# Patient Record
Sex: MALE | Race: WHITE | Employment: FULL TIME | URBAN - METROPOLITAN AREA
[De-identification: names, ages, dates, MRNs, and addresses within clinical notes are randomized per-mention and may not be internally consistent; named-entity substitution may affect disease eponyms.]

---

## 2017-10-03 ENCOUNTER — HOSPITAL ENCOUNTER (EMERGENCY)
Age: 47
Discharge: HOME | End: 2017-10-03
Attending: EMERGENCY MEDICINE
Payer: COMMERCIAL

## 2017-10-03 VITALS
TEMPERATURE: 97.9 F | SYSTOLIC BLOOD PRESSURE: 148 MMHG | OXYGEN SATURATION: 100 % | HEART RATE: 110 BPM | DIASTOLIC BLOOD PRESSURE: 86 MMHG

## 2017-10-03 DIAGNOSIS — S01.01XA LACERATION OF SCALP WITHOUT FOREIGN BODY, INITIAL ENCOUNTER: Primary | ICD-10-CM

## 2017-10-03 PROCEDURE — 6360000002 HC RX W HCPCS: Performed by: PHYSICIAN ASSISTANT

## 2017-10-03 PROCEDURE — 90471 IMMUNIZATION ADMIN: CPT | Performed by: PHYSICIAN ASSISTANT

## 2017-10-03 PROCEDURE — 90715 TDAP VACCINE 7 YRS/> IM: CPT | Performed by: PHYSICIAN ASSISTANT

## 2017-10-03 PROCEDURE — G0381 LEV 2 HOSP TYPE B ED VISIT: HCPCS

## 2017-10-03 PROCEDURE — 12002 RPR S/N/AX/GEN/TRNK2.6-7.5CM: CPT

## 2017-10-03 RX ORDER — IBUPROFEN 800 MG/1
800 TABLET ORAL EVERY 8 HOURS PRN
Qty: 30 TABLET | Refills: 0 | Status: SHIPPED | OUTPATIENT
Start: 2017-10-03

## 2017-10-03 RX ADMIN — TETANUS TOXOID, REDUCED DIPHTHERIA TOXOID AND ACELLULAR PERTUSSIS VACCINE, ADSORBED 0.5 ML: 5; 2.5; 8; 8; 2.5 SUSPENSION INTRAMUSCULAR at 13:44

## 2017-10-03 ASSESSMENT — ENCOUNTER SYMPTOMS
VOMITING: 0
SHORTNESS OF BREATH: 0
ABDOMINAL PAIN: 0
BACK PAIN: 0
DIARRHEA: 0
COLOR CHANGE: 0
NAUSEA: 0
COUGH: 0

## 2017-10-03 ASSESSMENT — PAIN DESCRIPTION - DESCRIPTORS: DESCRIPTORS: ACHING

## 2017-10-03 ASSESSMENT — PAIN DESCRIPTION - PAIN TYPE: TYPE: ACUTE PAIN

## 2017-10-03 ASSESSMENT — PAIN DESCRIPTION - FREQUENCY: FREQUENCY: INTERMITTENT

## 2017-10-03 ASSESSMENT — PAIN SCALES - GENERAL: PAINLEVEL_OUTOF10: 4

## 2017-10-03 ASSESSMENT — PAIN DESCRIPTION - LOCATION: LOCATION: HEAD

## 2017-10-03 NOTE — ED AVS SNAPSHOT
After Visit Summary  (Discharge Instructions)    Medication List for Home    Based on the information you provided to us as well as any changes during this visit, the following is your updated medication list.  Compare this with your prescription bottles at home. If you have any questions or concerns, contact your primary care physician's office. Daily Medication List (This medication list can be shared with any Healthcare provider who is helping you manage your medications)      There are NEW medications for you. START taking them after you leave the hospital     ibuprofen 800 MG tablet   Commonly known as:  ADVIL;MOTRIN   Take 1 tablet by mouth every 8 hours as needed for Pain         These are medications you told us you were taking at home, CONTINUE taking them after you leave the hospital     LISINOPRIL PO   Take by mouth       METFORMIN HCL PO   Take by mouth       SOLIQUA SC   Inject into the skin            Where to Get Your Medications      You can get these medications from any pharmacy     Bring a paper prescription for each of these medications     ibuprofen 800 MG tablet               Allergies as of 10/3/2017     No Known Allergies      Immunizations as of 10/3/2017     Name Date Dose VIS Date Route    Tdap (Boostrix, Adacel) 10/3/2017 0.5 mL 2/24/2015 Intramuscular         After Visit Summary    This summary was created for you. Thank you for entrusting your care to us.   The following information includes details about your hospital/visit stay along with steps you should take to help with your recovery once you leave the hospital.  In this packet, you will find information about the topics listed below:    · Instructions about your medications including a list of your home medications  · A summary of your hospital visit  · Follow-up appointments once you have left the hospital  · Your care plan at home Certain functionality such as prescription refills, scheduling appointments or sending messages to your provider are not activated if your provider does not use "[employer] in his/her office    For questions regarding your [employer] account call [phone]. If you have a clinical question, please call your doctor's office. The information on all pages of the After Visit Summary has been reviewed with me, the patient and/or responsible adult, by my health care provider(s). I had the opportunity to ask questions regarding this information. I understand I should dispose of my armband safely at home to protect my health information. A complete copy of the After Visit Summary has been given to me, the patient and/or responsible adult. Patient Signature/Responsible Adult: ___________________________________    Nurse Signature: ___________________________________  Resident/MLP Signature: ___________________________________  Attending Signature: ___________________________________    Date:____________Time:____________              Discharge Instructions       Kyara Murrell!!!    From [facility] Emergency Department    On behalf of the Emergency Department staff at [facility]. [facility] Emergency Department, I would like to thank you for giving [facility] the opportunity to address your health care needs and concerns. We hope that during your visit, our service was delivered in a professional and caring manner. Please keep [facility] in mind as we walk with you down the path to your own personal wellness. Pre-hypertension/Hypertension: You have been informed that you may have pre-hypertension or Hypertension based on a blood pressure reading in the Emergency Department. I recommend you call the primary care provider listed on your discharge instructions or a physician of your choice this week to arrange follow up for further evaluation of possible pre-hypertension or Hypertension. Cuts: Care Instructions  Your Care Instructions  A cut can happen anywhere on your body. Stitches, staples, skin adhesives, or pieces of tape called Steri-Strips are sometimes used to keep the edges of a cut together and help it heal. Steri-Strips can be used by themselves or with stitches or staples. Sometimes cuts are left open. If the cut went deep and through the skin, the doctor may have closed the cut in two layers. A deeper layer of stitches brings the deep part of the cut together. These stitches will dissolve and don't need to be removed. The upper layer closure, which could be stitches, staples, Steri-Strips, or adhesive, is what you see on the cut. A cut is often covered by a bandage. The doctor has checked you carefully, but problems can develop later. If you notice any problems or new symptoms, get medical treatment right away. Follow-up care is a key part of your treatment and safety. Be sure to make and go to all appointments, and call your doctor if you are having problems. It's also a good idea to know your test results and keep a list of the medicines you take. How can you care for yourself at home? If a cut is open or closed  · Prop up the sore area on a pillow anytime you sit or lie down during the next 3 days. Try to keep it above the level of your heart. This will help reduce swelling. · Keep the cut dry for the first 24 to 48 hours. After this, you can shower if your doctor okays it. Pat the cut dry. · Don't soak the cut, such as in a bathtub. Your doctor will tell you when it's safe to get the cut wet. · After the first 24 to 48 hours, clean the cut with soap and water 2 times a day unless your doctor gives you different instructions. ¨ Don't use hydrogen peroxide or alcohol, which can slow healing. ¨ You may cover the cut with a thin layer of petroleum jelly and a nonstick bandage.   ¨ If the doctor put a bandage over the cut, put on a new bandage after cleaning the cut or if the bandage gets wet or dirty. · Avoid any activity that could cause your cut to reopen. · Be safe with medicines. Read and follow all instructions on the label. ¨ If the doctor gave you a prescription medicine for pain, take it as prescribed. ¨ If you are not taking a prescription pain medicine, ask your doctor if you can take an over-the-counter medicine. If the cut is closed with stitches, staples, or Steri-Strips  · Follow the above instructions for open or closed cuts. · Do not remove the stitches or staples on your own. Your doctor will tell you when to come back to have the stitches or staples removed. · Leave Steri-Strips on until they fall off. If the cut is closed with a skin adhesive  · Follow the above instructions for open or closed cuts. · Leave the skin adhesive on your skin until it falls off on its own. This may take 5 to 10 days. · Do not scratch, rub, or pick at the adhesive. · Do not put the sticky part of a bandage directly on the adhesive. · Do not put any kind of ointment, cream, or lotion over the area. This can make the adhesive fall off too soon. Do not use hydrogen peroxide or alcohol, which can slow healing. When should you call for help? Call your doctor now or seek immediate medical care if:  · You have new pain, or your pain gets worse. · The skin near the cut is cold or pale or changes color. · You have tingling, weakness, or numbness near the cut. · The cut starts to bleed, and blood soaks through the bandage. Oozing small amounts of blood is normal.  · You have trouble moving the area near the cut. · You have symptoms of infection, such as:  ¨ Increased pain, swelling, warmth, or redness around the cut. ¨ Red streaks leading from the cut. ¨ Pus draining from the cut. ¨ A fever. Watch closely for changes in your health, and be sure to contact your doctor if:  · The cut reopens. · You do not get better as expected. Where can you learn more? Go to [email or web address] org and sign in to your [employer] account. Enter M735 in the [employer] box to learn more about \"Cuts: Care Instructions. \"     If you do not have an account, please click on the \"Sign Up Now\" link. Current as of: March 20, 2017  Content Version: 11.3  © 5204-4129 [employer]. Care instructions adapted under license by [employer] [facility] ([facility]). If you have questions about a medical condition or this instruction, always ask your healthcare professional. [address] any warranty or liability for your use of this information. If you notice any concerning symptoms please return to the ER immediately. These can include but are not limited to: fevers, chills, shortness of breath, vomiting, weakness of the extremities, changes in her mental status, numbness, pale extremities, or chest pain. Please call tomorrow to schedule a follow up with one of the clinics provided to below in 2-4 days. Please look for infectious symptoms discussed with the you in the room. Please return to the emergency department if you cannot get into the 1 the clinics in 2 days for a wound recheck. Please return if it appears infected like we discussed. [facility] Emergency [address]  · [address]  · [phone]   · Pediatric Primary Care/ Adult Primary Care / OB/Prenatal/GYN/Specialty Clinics   · Monday  Friday (8a  4:30p)  -----------------------------------------------------------  [facility]  · [address]  · [phone]   · Assistance with applying for chronic long term meds (high BP, Diabetes, etc), offered thru programs made available by various pharmaceutical companies   · Monday  Friday  · Call to make appointment  -----------------------------------------------------------  [facility]  · [address] [facility]  · [phone] -------------------------------------------------------------  Di[facility]  · [phone]    · Call for appointment  -------------------------------------------------------------  [facility]  [address] [place] [address] [id number] Fri  8:30a  4p

## 2017-10-03 NOTE — ED PROVIDER NOTES
[address] [facility]     Emergency Department     Faculty Attestation    I performed a history and physical examination of the patient and discussed management with the resident. I have reviewed and agree with the residents findings including all diagnostic interpretations, and treatment plans as written. Any areas of disagreement are noted on the chart. I was personally present for the key portions of any procedures. I have documented in the chart those procedures where I was not present during the key portions. I have reviewed the emergency nurses triage note. I agree with the chief complaint, past medical history, past surgical history, allergies, medications, social and family history as documented unless otherwise noted below. Documentation of the HPI, Physical Exam and Medical Decision Making performed by scribmonika is based on my personal performance of the HPI, PE and MDM. For Physician Assistant/ Nurse Practitioner cases/documentation I have personally evaluated this patient and have completed at least one if not all key elements of the E/M (history, physical exam, and MDM). Additional findings are as noted. Pre-hypertension/Hypertension: The patient has been informed that they may have pre-hypertension or Hypertension based on a blood pressure reading in the emergency department. I recommend that the patient call the primary care provider listed on their discharge instructions or a physician of their choice this week to arrange follow up for further evaluation of possible pre-hypertension or Hypertension. Ora Gallagher MD, [facility]  Attending Emergency Medicine Physician         Jackie Salcedo MD  10/03/17 2478

## 2017-10-03 NOTE — ED PROVIDER NOTES
[address]  ED  eMERGENCY dEPARTMENT eNCOUnter      Pt Name: Kiana Curtis  MRN: [id number]  [place] 1970  Date of evaluation: 10/3/2017  Provider: Robyn Gunderson PA-C    CHIEF COMPLAINT       Chief Complaint   Patient presents with    Head Laceration     pt c/o head laceration, active bleeding at this time pt was working and stood up into duct work, pt denies any loc. HISTORY OF PRESENT ILLNESS  (Location/Symptom, Timing/Onset, Context/Setting, Quality, Duration, Modifying Factors, Severity.)   Kiana Curtis is a 55 y.o. male who presents to the emergency department With a laceration to the top of his head. Patient states he was in a crawl space and lifted up and thought he was clear but he hit his head on some duct work. Patient did not lose consciousness and does not have a headache at this time. No visual disturbances. Patient states he does need a tetanus shot at this time. Low venous oozing. Cranial nerves within normal limits. Patient is neurovascularly intact. Patient denies any other symptoms. Patient denies chest pain, shortness of breath, abdominal pain, headache, fever, chills, nausea, vomiting, diarrhea, numbness and tingling anywhere in his body. Patient denies any neck pain at this time. Patient is resting comfortably on the table and appears in no acute distress. Patient appears nontoxic. The wound is not gaping. Noninfected. REVIEW OF SYSTEMS    (2-9 systems for level 4, 10 or more for level 5)     Review of Systems   Constitutional: Negative for chills, fatigue and fever. Respiratory: Negative for cough and shortness of breath. Cardiovascular: Negative for chest pain, palpitations and leg swelling. Gastrointestinal: Negative for abdominal pain, diarrhea, nausea and vomiting. Musculoskeletal: Negative for back pain, neck pain and neck stiffness. Skin: Negative for color change. 6 cm non-gaping laceration on the scalp of the head. Non-infected. Neurological: Negative for dizziness, weakness, light-headedness, numbness and headaches. PAST MEDICAL HISTORY   No past medical history on file. SURGICAL HISTORY     No past surgical history on file. CURRENT MEDICATIONS       Previous Medications    INSULIN GLARGINE-LIXISENATIDE (SOLIQUA SC)    Inject into the skin    LISINOPRIL PO    Take by mouth    METFORMIN HCL PO    Take by mouth       ALLERGIES     Review of patient's allergies indicates no known allergies. Reviewed  FAMILY HISTORY     No family history on file. No family status information on file. SOCIAL HISTORY          PHYSICAL EXAM    (up to 7 for level 4, 8 or more for level 5)     Vitals:    10/03/17 1326   BP: (!) 148/86   Pulse: 110   Temp: 97.9 °F (36.6 °C)   TempSrc: Oral   SpO2: 100%       Physical Exam   Constitutional: He is oriented to person, place, and time. He appears well-developed and well-nourished. No distress. HENT:   Head: Normocephalic and atraumatic. Eyes: Conjunctivae are normal. Pupils are equal, round, and reactive to light. Neck: Normal range of motion. Neck supple. Cardiovascular: Normal rate, regular rhythm, normal heart sounds and intact distal pulses. Exam reveals no gallop and no friction rub. No murmur heard. Pulmonary/Chest: Effort normal and breath sounds normal. No respiratory distress. He has no wheezes. He has no rales. Musculoskeletal: Normal range of motion. Neurological: He is alert and oriented to person, place, and time. No cranial nerve deficit. Skin: Skin is warm and dry. He is not diaphoretic. Psychiatric: He has a normal mood and affect.  His behavior is normal. Judgment and thought content normal.         DIAGNOSTIC RESULTS       No orders to display         LABS:  Labs Reviewed - No data to display        EMERGENCY DEPARTMENT COURSE and DIFFERENTIAL DIAGNOSIS/MDM:   Vitals:    Vitals:    10/03/17 1326   BP: (!) 148/86   Pulse: 110   Temp: 97.9 °F (36.6 °C)   TempSrc: Oral   SpO2: 100%       Patient was given ibuprofen to go home with and And his tetanus was updated and discussed all the symptoms symptoms and signs of infection and told to return the emergency department the present. He should go to one of the clinics provided to him and his discharge instructions within 3 days for wound check if he cannot get into one he should come here to the emergency department for wound check. Patient understood and will comply. Patient is satisfied. All questions answered. He is told to keep the wound clean and dry. This patient was seen by the attending physician and they agreed with the assessment and plan. CONSULTS:  None    PROCEDURES:  Lac Repair  Date/Time: 10/3/2017 1:55 PM  Performed by: Jay Garvey  Authorized by: Jayden Done     Consent:     Consent obtained:  Verbal    Consent given by:  Patient    Risks discussed:  Infection, need for additional repair, poor cosmetic result, poor wound healing and pain    Alternatives discussed:  No treatment, delayed treatment, observation and referral  Anesthesia (see MAR for exact dosages):      Anesthesia method:  None  Laceration details:     Location:  Scalp    Scalp location:  Mid-scalp    Length (cm):  6  Repair type:     Repair type:  Simple  Pre-procedure details:     Preparation:  Patient was prepped and draped in usual sterile fashion  Exploration:     Wound exploration: wound explored through full range of motion and entire depth of wound probed and visualized      Contaminated: yes    Treatment:     Area cleansed with:  Betadine    Amount of cleaning:  Standard    Irrigation solution:  Sterile saline    Irrigation volume:  500    Irrigation method:  Pressure wash    Visualized foreign bodies/material removed: no    Skin repair:     Repair method:  Tissue adhesive  Approximation:     Approximation:  Close    Vermilion border: well-aligned    Post-procedure details:     Dressing:  Adhesive

## 2025-08-04 ENCOUNTER — OFFICE VISIT (OUTPATIENT)
Dept: PODIATRY | Age: 55
End: 2025-08-04

## 2025-08-04 VITALS — HEIGHT: 71 IN | BODY MASS INDEX: 32.2 KG/M2 | WEIGHT: 230 LBS

## 2025-08-04 DIAGNOSIS — D23.72 BENIGN NEOPLASM OF SKIN OF LOWER LIMB, INCLUDING HIP, LEFT: ICD-10-CM

## 2025-08-04 DIAGNOSIS — M79.672 LEFT FOOT PAIN: ICD-10-CM

## 2025-08-04 DIAGNOSIS — M79.672 PAIN IN BOTH FEET: ICD-10-CM

## 2025-08-04 DIAGNOSIS — B35.1 DERMATOPHYTOSIS OF NAIL: Primary | ICD-10-CM

## 2025-08-04 DIAGNOSIS — M79.671 PAIN IN BOTH FEET: ICD-10-CM

## 2025-08-04 RX ORDER — CICLOPIROX 80 MG/ML
SOLUTION TOPICAL
Qty: 6 ML | Refills: 3 | Status: SHIPPED | OUTPATIENT
Start: 2025-08-04

## 2025-08-04 RX ORDER — TIRZEPATIDE 10 MG/.5ML
INJECTION, SOLUTION SUBCUTANEOUS
COMMUNITY
Start: 2025-07-16

## 2025-08-04 RX ORDER — INSULIN GLARGINE 300 U/ML
INJECTION, SOLUTION SUBCUTANEOUS
COMMUNITY
Start: 2025-07-14

## 2025-08-04 RX ORDER — GLIMEPIRIDE 2 MG/1
TABLET ORAL
COMMUNITY
Start: 2025-07-21

## 2025-08-04 RX ORDER — VALSARTAN 320 MG/1
TABLET ORAL
COMMUNITY
Start: 2025-07-21